# Patient Record
Sex: FEMALE | Race: WHITE | URBAN - METROPOLITAN AREA
[De-identification: names, ages, dates, MRNs, and addresses within clinical notes are randomized per-mention and may not be internally consistent; named-entity substitution may affect disease eponyms.]

---

## 2017-04-19 ENCOUNTER — OFFICE VISIT (OUTPATIENT)
Dept: FAMILY MEDICINE CLINIC | Facility: CLINIC | Age: 22
End: 2017-04-19

## 2017-04-19 VITALS
OXYGEN SATURATION: 97 % | HEIGHT: 63 IN | SYSTOLIC BLOOD PRESSURE: 121 MMHG | HEART RATE: 109 BPM | TEMPERATURE: 98 F | DIASTOLIC BLOOD PRESSURE: 70 MMHG

## 2017-04-19 DIAGNOSIS — J06.9 VIRAL URI WITH COUGH: Primary | ICD-10-CM

## 2017-04-19 PROCEDURE — 99203 OFFICE O/P NEW LOW 30 MIN: CPT | Performed by: PHYSICIAN ASSISTANT

## 2017-04-19 NOTE — PROGRESS NOTES
CHIEF COMPLAINT:   Patient presents with:  Cold: x2-3 days    HPI:   Nola Childs is a 20year old female who presents for upper and lower respiratory symptoms for  3 days.  Patient reports PND, loss of voice/hoarseness, scratchy/ sore throat, congestion [DISCONTINUED] amphetamine-dextroamphetamine (ADDERALL) 15 MG Oral Tab Take 1 tablet (15 mg total) by mouth daily.  Disp: 30 tablet Rfl: 0   [DISCONTINUED] Amphetamine-Dextroamphet ER (ADDERALL XR) 30 MG Oral Capsule SR 24 Hr Take 1 capsule (30 mg total) by LUNGS: clear to auscultation bilaterally, no wheezes or rhonchi. Breathing is non labored. +dry cough. CARDIO: RRR without murmur. LYMPH: No lymphadenopathy.       ASSESSMENT AND PLAN:   Rashid Ware is a 20year old female who presents with     ASSES · Put fluid back into your body. Take frequent sips of clear liquids such as water or broth. Do not drink beverages with a lot of sugar in them, such as juices and sodas. These can make diarrhea worse. Older children and adults can drink sports drinks.   ·

## 2017-05-19 ENCOUNTER — TELEPHONE (OUTPATIENT)
Dept: OBGYN CLINIC | Facility: CLINIC | Age: 22
End: 2017-05-19

## 2017-06-27 ENCOUNTER — TELEPHONE (OUTPATIENT)
Dept: OBGYN CLINIC | Facility: CLINIC | Age: 22
End: 2017-06-27

## 2017-08-15 ENCOUNTER — TELEPHONE (OUTPATIENT)
Dept: OBGYN CLINIC | Facility: CLINIC | Age: 22
End: 2017-08-15

## (undated) NOTE — MR AVS SNAPSHOT
[facility]  [address] [address] [phone]               Thank you for choosing us for your health care visit with Zbigniew Barbosa PA-C.   We are glad to serve you and happy to provide you with this summa · Saline nasal sprays and decongestant tablets help open a stuffy nose. Antihistamines can also help, but they can cause side effects such as drowsiness and drying of the eyes, nose, and mouth.   Soothe a sore throat and cough  · Gargle every 2 hours with 1 Allergies as of Apr 19, 2017     Penicillins Unknown    Septra [Sulfamethoxazole W/Trimethoprim] Unknown    ulser    Vicodin [Hydrocodone-Acetaminophen] Nausea only                Today's Vital Signs     BP Pulse Temp Height Breastfeeding?        121/70 mmH your Zip Code and Date of Birth to complete the sign-up process. If you do not sign up before the expiration date, you must request a new code.     Your unique giftee Access Code: [email or web address]  Expires: 6/16/2017  1:28 PM    If you have questions, you can c

## (undated) NOTE — Clinical Note
Date: 4/19/2017    Patient Name: Lesly Vargas          To Whom it may concern: This letter has been written at the patient's request. The above patient was seen at the [facility] for treatment of a medical condition.     This patient amanda